# Patient Record
Sex: FEMALE | Race: WHITE | NOT HISPANIC OR LATINO | Employment: OTHER | URBAN - METROPOLITAN AREA
[De-identification: names, ages, dates, MRNs, and addresses within clinical notes are randomized per-mention and may not be internally consistent; named-entity substitution may affect disease eponyms.]

---

## 2022-08-23 ENCOUNTER — TELEPHONE (OUTPATIENT)
Dept: OBGYN CLINIC | Facility: CLINIC | Age: 67
End: 2022-08-23

## 2022-08-23 DIAGNOSIS — Z12.31 ENCOUNTER FOR SCREENING MAMMOGRAM FOR MALIGNANT NEOPLASM OF BREAST: Primary | ICD-10-CM

## 2022-08-23 NOTE — TELEPHONE ENCOUNTER
Mammogram order generated, note sent to José Luis Mora in [place] to print for patient  and call patient when ready

## 2022-09-20 DIAGNOSIS — Z12.31 ENCOUNTER FOR SCREENING MAMMOGRAM FOR MALIGNANT NEOPLASM OF BREAST: ICD-10-CM

## 2022-11-25 NOTE — PROGRESS NOTES
Assessment/Plan:    Encounter for gynecological examination (general) (routine) without abnormal findings  Here for Medicare biannual check  Questions OAB, constipation with intermittent RUQ pain, difficulty maintaining hygiene with hemorrhoidal tags, and memory concerns  Normal breast and pelvic exams  Last pap 9/2019 negative/HPV negative; no further indicated  Mammo order given, last 9/14/22 reviewed on [facility]  Colonoscopy 2020,due 2025  Dexa 7/3/2018 WNL with femoral neck T-1 1    Encouraged fiber daily for constipation and stool issues  Discussed incontinence  Recommended bladder training with timed voids, avoidance of irritants specifically tea/coffee/soda products and Kegel exercises  Due to history of glaucoma would avoid meds until cleared by opthalmology  Encouraged f/u with PCP for memory concerns  Diagnoses and all orders for this visit:    Encounter for gynecological examination (general) (routine) without abnormal findings    Encounter for screening mammogram for malignant neoplasm of breast  -     Mammo screening bilateral w 3d & cad; Future    OAB (overactive bladder)    Other orders  -     Liquid-based pap, screening  -     levothyroxine (Synthroid) 137 mcg tablet  -     atorvastatin (LIPITOR) 20 mg tablet  -     Multiple Vitamins-Minerals (MULTIVITAMIN ADULTS 50+ PO)          Subjective:      Patient ID: Sofya Hugo is a 79 y o  female  HPI  Here for Medicare biannual breast and pelvic exams  The following portions of the patient's history were reviewed and updated as appropriate:   She  has a past medical history of Glaucoma, Hyperlipidemia, Hypothyroidism due to Hashimoto's thyroiditis, Osteoarthritis, and Tinnitus    She   Patient Active Problem List    Diagnosis Date Noted   • Encounter for gynecological examination (general) (routine) without abnormal findings 11/28/2022   • OAB (overactive bladder) 11/28/2022     She  has a past surgical history that includes Colonoscopy (2015); DXA procedure(historical) (2018); Thyroid surgery (1991); Knee surgery (1991); Shoulder surgery (Right, 2014); and Replacement total knee (Left, 08/2020)  Her family history includes Colon polyps in her brother; Prostate cancer in her brother; Prostate cancer (age of onset: 61) in her father  She  reports that she has never smoked  She has never used smokeless tobacco  She reports current alcohol use  She reports that she does not use drugs  Current Outpatient Medications   Medication Sig Dispense Refill   • atorvastatin (LIPITOR) 20 mg tablet      • levothyroxine (Synthroid) 137 mcg tablet      • Multiple Vitamins-Minerals (MULTIVITAMIN ADULTS 50+ PO)        No current facility-administered medications for this visit  She has No Known Allergies       Review of Systems  No PMB, breast, bladder, bowel changes   +intermittent RUQ pain +constipation  +urge incontinence in AM      Objective:      /68 (BP Location: Left arm, Patient Position: Sitting, Cuff Size: Standard)   Ht 5' (1 524 m)   Wt 68 9 kg (151 lb 12 8 oz)   Breastfeeding No   BMI 29 65 kg/m²          Physical Exam    General appearance: no distress, pleasant  Neck: thyroid without nodules or thyromegaly, no palpable adenopathy  Lymph nodes: no palpable adenopathy  Breasts: no masses, nodes or skin changes  Abdomen: soft, non tender, no palpable masses  Pelvic exam: normal atrophic external genitalia, urethral meatus normal, vagina atrophic without lesions, cervix atrophic without lesions, uterus small, non tender, no adnexal masses, non tender  Rectal exam: normal sphincter tone, no masses, RV confirms above

## 2022-11-28 ENCOUNTER — ANNUAL EXAM (OUTPATIENT)
Dept: OBGYN CLINIC | Facility: CLINIC | Age: 67
End: 2022-11-28

## 2022-11-28 VITALS
HEIGHT: 60 IN | DIASTOLIC BLOOD PRESSURE: 68 MMHG | WEIGHT: 151.8 LBS | BODY MASS INDEX: 29.8 KG/M2 | SYSTOLIC BLOOD PRESSURE: 104 MMHG

## 2022-11-28 DIAGNOSIS — Z12.31 ENCOUNTER FOR SCREENING MAMMOGRAM FOR MALIGNANT NEOPLASM OF BREAST: ICD-10-CM

## 2022-11-28 DIAGNOSIS — K59.09 OTHER CONSTIPATION: ICD-10-CM

## 2022-11-28 DIAGNOSIS — Z01.419 ENCOUNTER FOR GYNECOLOGICAL EXAMINATION (GENERAL) (ROUTINE) WITHOUT ABNORMAL FINDINGS: Primary | ICD-10-CM

## 2022-11-28 DIAGNOSIS — N32.81 OAB (OVERACTIVE BLADDER): ICD-10-CM

## 2022-11-28 RX ORDER — LEVOTHYROXINE SODIUM 137 UG/1
TABLET ORAL
COMMUNITY
Start: 1992-03-05

## 2022-11-28 RX ORDER — ATORVASTATIN CALCIUM 20 MG/1
TABLET, FILM COATED ORAL
COMMUNITY
Start: 2019-07-20

## 2022-11-28 NOTE — PATIENT INSTRUCTIONS
Return to office in two years unless having any problems such as breast changes, bleeding, new persistent pain, new progressive bloating, new problems eating (getting full to quickly) or new constant urinary pressure that does not resolve in one week  Call in 18 months; May of 2024 for you November 2024 appointment    Follow up with Dr Agata Flores for your memory concerns and possible testing  For the incontinence, I recommend bladder training with timed voids every 2-3 hours while awake, avoidance of irritants specifically tea/coffee/soda products and Kegel exercises  You can teach yourself the exercises by stopping your urine flow on the toilet  Then hold for 10 seconds, 10 times in a row  If you do that set of exercises 2-3 times per day, you will gain strength, then when you sneeze you can squeeze up first and getter better control  Ask you opthalmologist whether you can take over active bladder medication with your particular type of glaucoma  Citrucel, metamucil or Benefiber daily for your bowel regularity

## 2022-11-28 NOTE — ASSESSMENT & PLAN NOTE
Here for Medicare biannual check  Questions OAB, constipation with intermittent RUQ pain, difficulty maintaining hygiene with hemorrhoidal tags, and memory concerns  Normal breast and pelvic exams  Last pap 9/2019 negative/HPV negative; no further indicated  Mammo order given, last 9/14/22 reviewed on [facility]  Colonoscopy 2020,due 2025  Dexa 7/3/2018 WNL with femoral neck T-1 1    Encouraged fiber daily for constipation and stool issues  Discussed incontinence  Recommended bladder training with timed voids, avoidance of irritants specifically tea/coffee/soda products and Kegel exercises  Due to history of glaucoma would avoid meds until cleared by opthalmology  Encouraged f/u with PCP for memory concerns

## 2024-01-16 ENCOUNTER — OFFICE VISIT (OUTPATIENT)
Dept: OBGYN CLINIC | Facility: CLINIC | Age: 69
End: 2024-01-16
Payer: MEDICARE

## 2024-01-16 VITALS
HEIGHT: 60 IN | WEIGHT: 150.6 LBS | DIASTOLIC BLOOD PRESSURE: 72 MMHG | SYSTOLIC BLOOD PRESSURE: 118 MMHG | BODY MASS INDEX: 29.57 KG/M2

## 2024-01-16 DIAGNOSIS — L72.3 SEBACEOUS CYST: ICD-10-CM

## 2024-01-16 DIAGNOSIS — R10.2 PELVIC PAIN: ICD-10-CM

## 2024-01-16 DIAGNOSIS — F41.8 ANXIETY ABOUT HEALTH: ICD-10-CM

## 2024-01-16 DIAGNOSIS — R31.0 GROSS HEMATURIA: Primary | ICD-10-CM

## 2024-01-16 PROBLEM — R45.89 ANXIETY ABOUT HEALTH: Status: ACTIVE | Noted: 2024-01-16

## 2024-01-16 PROCEDURE — 99215 OFFICE O/P EST HI 40 MIN: CPT | Performed by: OBSTETRICS & GYNECOLOGY

## 2024-01-16 RX ORDER — AMOXICILLIN 500 MG
CAPSULE ORAL 2 TIMES DAILY
COMMUNITY

## 2024-01-16 NOTE — PATIENT INSTRUCTIONS
I would recommend seeing your gastroenterologist for the discomfort, family history of polyps and the diverticulosis seen on the CT.     Please call the office if you do not hear about your results in 10-14 days.     Have Dr Cabral look at your sebaceous cyst on the left chest.    Make an appointment with a urologist for the history of blood in your urine. You need further evaluation to assess the inside of your bladder.

## 2024-01-16 NOTE — PROGRESS NOTES
"Assessment/Plan:    Gross hematuria  67 yo  presents with c/o recent UTI with gross hematuria and feeling of something \"not right\" in abdomen.   Also with constipation, recently better with metaucil.   Was in [place] last week, no problems then.     Reviewed 23 CT from [facility] with diverticula, increased colon gas and stool, distended bladder with significant diffuse mural thickening suggestive of cystitis. No adnexal masses. Significant disc and facet degeneration several levls, severe levoscoliosis  Reviewed PCP note 23. Pt was in urgent care 23 for UTI, hematuria noted 23. Was referred to urology but did not schedule as recently moved out of area.     Normal pelvic exam noted.   Discussed symptoms may be due to constipation, will continue metamucil and f/u with her GI for possible early colonoscopy which is due .   Ordered pelvic u/s to rule out pelvic pathology not seen on CT, will contact with results.    Addressed health anxiety with 2 good friends with cancer and concern to find something if it is there. Reviewed up to date with breast, colon, cervical and skin cancer screening without evidence of disease currently.   Stressed urology follow up to evaluated bladder with probable cystoscopy to rule out bladder pathology.     RTO for biannual exam 2024, prn problems.      Sebaceous cyst  Very small left sebaceous cyst noted below left breast. Will f/u with derm next week at scheduled appt.   Normal breast exam, last mammo reviewed on [facility] 10/5/23 BIRADS 1B.    Anxiety about health  Reassured that health anxiety is common especially when friends have dealt with cancer. Reassurance given today, encouraged continued screening for future issues and stressed f/u with GI and  providers.  Agrees to plan.       Diagnoses and all orders for this visit:    Gross hematuria  -     Cytology, urine    Pelvic pain  -     US pelvis complete w transvaginal; Future    Sebaceous cyst    Anxiety " about health    Other orders  -     Omega-3 Fatty Acids (Fish Oil) 1200 MG CAPS; Take by mouth 2 (two) times a day  -     Calcium Citrate-Vitamin D (CALCIUM + D PO); Take by mouth          Subjective:      Patient ID: Mary Franklin is a 68 y.o. female.    HPI here with several concerns.    The following portions of the patient's history were reviewed and updated as appropriate: She  has a past medical history of Glaucoma, History of screening mammography (09/14/2022), Hyperlipidemia, Hypothyroidism due to Hashimoto's thyroiditis, Miscarriage, Osteoarthritis, Tinnitus, and UTI (urinary tract infection) (09/2023).  She   Patient Active Problem List    Diagnosis Date Noted    Pelvic pain 01/16/2024    Gross hematuria 01/16/2024    Sebaceous cyst 01/16/2024    Anxiety about health 01/16/2024    Encounter for gynecological examination (general) (routine) without abnormal findings 11/28/2022    OAB (overactive bladder) 11/28/2022    Other constipation 11/28/2022     She  has a past surgical history that includes Colonoscopy (2015); DXA procedure(historical) (2018); Thyroid surgery (1991); Knee surgery (1991); Shoulder surgery (Right, 2014); and Replacement total knee (Left, 08/2020).  Her family history includes Cancer in her brother and father; Colon polyps in her brother; Osteoarthritis in her mother; Prostate cancer in her brother; Prostate cancer (age of onset: 63) in her father.  She  reports that she has never smoked. She has never used smokeless tobacco. She reports current alcohol use of about 1.0 standard drink of alcohol per week. She reports that she does not use drugs.  Current Outpatient Medications   Medication Sig Dispense Refill    atorvastatin (LIPITOR) 20 mg tablet       Calcium Citrate-Vitamin D (CALCIUM + D PO) Take by mouth      levothyroxine (Synthroid) 137 mcg tablet       Multiple Vitamins-Minerals (MULTIVITAMIN ADULTS 50+ PO)       Omega-3 Fatty Acids (Fish Oil) 1200 MG CAPS Take by mouth 2 (two)  "times a day       No current facility-administered medications for this visit.     She is allergic to dust mite extract..    Review of Systems   Constitutional:  Negative for appetite change, chills, fever and unexpected weight change.   Gastrointestinal:  Positive for constipation. Negative for abdominal distention, abdominal pain, blood in stool, diarrhea, nausea and vomiting.        \"Something not right\", increased gas   Genitourinary:  Positive for dyspareunia and hematuria. Negative for dysuria, frequency, pelvic pain, urgency, vaginal bleeding and vaginal discharge.        Gross hematuria resolved after antibiotic treatment   Musculoskeletal:  Positive for back pain.        Height loss, scoliosis   Neurological:         C/o memory issues   Psychiatric/Behavioral:          Anxiety         Objective:      /72   Ht 5' (1.524 m)   Wt 68.3 kg (150 lb 9.6 oz)   Breastfeeding No   BMI 29.41 kg/m²          Physical Exam    General appearance: no distress, pleasant, anxious  Neck: thyroid without nodules or thyromegaly, no palpable adenopathy  Lymph nodes: no palpable adenopathy  Breasts: no masses, nodes or skin changes  Chest: left 2 mm sebaceous cyst below breast  Abdomen: soft, non tender, no palpable masses  Pelvic exam: normal atrophic external genitalia, urethral meatus normal, vagina atrophic without lesions, cervix atrophic without lesions, uterus small, non tender, no adnexal masses, non tender  Rectal exam: normal sphincter tone, no masses, RV confirms above      I have spent a total time of 50 minutes on 01/16/24 in caring for this patient including Diagnostic results, Prognosis, Risks and benefits of tx options, Instructions for management, Patient and family education, Importance of tx compliance, Risk factor reductions, Impressions, Counseling / Coordination of care, Documenting in the medical record, Reviewing / ordering tests, medicine, procedures  , Obtaining or reviewing history  , and " Communicating with other healthcare professionals .

## 2024-01-16 NOTE — ASSESSMENT & PLAN NOTE
Very small left sebaceous cyst noted below left breast. Will f/u with derm next week at scheduled appt.   Normal breast exam, last mammo reviewed on [facility] 10/5/23 BIRADS 1B.

## 2024-01-16 NOTE — ASSESSMENT & PLAN NOTE
"69 yo  presents with c/o recent UTI with gross hematuria and feeling of something \"not right\" in abdomen.   Also with constipation, recently better with metaucil.   Was in [place] last week, no problems then.     Reviewed 23 CT from [facility] with diverticula, increased colon gas and stool, distended bladder with significant diffuse mural thickening suggestive of cystitis. No adnexal masses. Significant disc and facet degeneration several levls, severe levoscoliosis  Reviewed PCP note 23. Pt was in urgent care 23 for UTI, hematuria noted 23. Was referred to urology but did not schedule as recently moved out of area.     Normal pelvic exam noted.   Discussed symptoms may be due to constipation, will continue metamucil and f/u with her GI for possible early colonoscopy which is due .   Ordered pelvic u/s to rule out pelvic pathology not seen on CT, will contact with results.    Addressed health anxiety with 2 good friends with cancer and concern to find something if it is there. Reviewed up to date with breast, colon, cervical and skin cancer screening without evidence of disease currently.   Stressed urology follow up to evaluated bladder with probable cystoscopy to rule out bladder pathology.     RTO for biannual exam 2024, prn problems.    "

## 2024-01-16 NOTE — LETTER
"2024     Shawna Ron MD  [address] [facility] [address]    Patient: Mary Franklin   YOB: 1955   Date of Visit: 2024       Dear Dr. Ron:     Mary Franklin was in to see me today for evaluation. Below are my notes for this consultation.    If you have questions, please do not hesitate to call me. I look forward to following your patient along with you.         Sincerely,        Lor Yoder MD        CC: No Recipients    Lor Yoder MD  2024 12:53 PM  Sign when Signing Visit  Assessment/Plan:    Gross hematuria  69 yo  presents with c/o recent UTI with gross hematuria and feeling of something \"not right\" in abdomen.   Also with constipation, recently better with metaucil.   Was in [place] last week, no problems then.     Reviewed 23 CT from [facility] with diverticula, increased colon gas and stool, distended bladder with significant diffuse mural thickening suggestive of cystitis. No adnexal masses. Significant disc and facet degeneration several levls, severe levoscoliosis  Reviewed PCP note 23. Pt was in urgent care 23 for UTI, hematuria noted 23. Was referred to urology but did not schedule as recently moved out of area.     Normal pelvic exam noted.   Discussed symptoms may be due to constipation, will continue metamucil and f/u with her GI for possible early colonoscopy which is due .   Ordered pelvic u/s to rule out pelvic pathology not seen on CT, will contact with results.    Addressed health anxiety with 2 good friends with cancer and concern to find something if it is there. Reviewed up to date with breast, colon, cervical and skin cancer screening without evidence of disease currently.   Stressed urology follow up to evaluated bladder with probable cystoscopy to rule out bladder pathology.     RTO for biannual exam 2024, prn problems.      Sebaceous cyst  Very small left sebaceous cyst noted below left breast. Will f/u with derm next " week at scheduled appt.   Normal breast exam, last mammo reviewed on [facility] 10/5/23 BIRADS 1B.    Anxiety about health  Reassured that health anxiety is common especially when friends have dealt with cancer. Reassurance given today, encouraged continued screening for future issues and stressed f/u with GI and  providers.  Agrees to plan.       Diagnoses and all orders for this visit:    Gross hematuria  -     Cytology, urine    Pelvic pain  -     US pelvis complete w transvaginal; Future    Sebaceous cyst    Anxiety about health    Other orders  -     Omega-3 Fatty Acids (Fish Oil) 1200 MG CAPS; Take by mouth 2 (two) times a day  -     Calcium Citrate-Vitamin D (CALCIUM + D PO); Take by mouth          Subjective:      Patient ID: Mary Franklin is a 68 y.o. female.    HPI here with several concerns.    The following portions of the patient's history were reviewed and updated as appropriate: She  has a past medical history of Glaucoma, History of screening mammography (09/14/2022), Hyperlipidemia, Hypothyroidism due to Hashimoto's thyroiditis, Miscarriage, Osteoarthritis, Tinnitus, and UTI (urinary tract infection) (09/2023).  She   Patient Active Problem List    Diagnosis Date Noted   • Pelvic pain 01/16/2024   • Gross hematuria 01/16/2024   • Sebaceous cyst 01/16/2024   • Anxiety about health 01/16/2024   • Encounter for gynecological examination (general) (routine) without abnormal findings 11/28/2022   • OAB (overactive bladder) 11/28/2022   • Other constipation 11/28/2022     She  has a past surgical history that includes Colonoscopy (2015); DXA procedure(historical) (2018); Thyroid surgery (1991); Knee surgery (1991); Shoulder surgery (Right, 2014); and Replacement total knee (Left, 08/2020).  Her family history includes Cancer in her brother and father; Colon polyps in her brother; Osteoarthritis in her mother; Prostate cancer in her brother; Prostate cancer (age of onset: 63) in her father.  She  reports  "that she has never smoked. She has never used smokeless tobacco. She reports current alcohol use of about 1.0 standard drink of alcohol per week. She reports that she does not use drugs.  Current Outpatient Medications   Medication Sig Dispense Refill   • atorvastatin (LIPITOR) 20 mg tablet      • Calcium Citrate-Vitamin D (CALCIUM + D PO) Take by mouth     • levothyroxine (Synthroid) 137 mcg tablet      • Multiple Vitamins-Minerals (MULTIVITAMIN ADULTS 50+ PO)      • Omega-3 Fatty Acids (Fish Oil) 1200 MG CAPS Take by mouth 2 (two) times a day       No current facility-administered medications for this visit.     She is allergic to dust mite extract..    Review of Systems   Constitutional:  Negative for appetite change, chills, fever and unexpected weight change.   Gastrointestinal:  Positive for constipation. Negative for abdominal distention, abdominal pain, blood in stool, diarrhea, nausea and vomiting.        \"Something not right\", increased gas   Genitourinary:  Positive for dyspareunia and hematuria. Negative for dysuria, frequency, pelvic pain, urgency, vaginal bleeding and vaginal discharge.        Gross hematuria resolved after antibiotic treatment   Musculoskeletal:  Positive for back pain.        Height loss, scoliosis   Neurological:         C/o memory issues   Psychiatric/Behavioral:          Anxiety         Objective:      /72   Ht 5' (1.524 m)   Wt 68.3 kg (150 lb 9.6 oz)   Breastfeeding No   BMI 29.41 kg/m²          Physical Exam    General appearance: no distress, pleasant, anxious  Neck: thyroid without nodules or thyromegaly, no palpable adenopathy  Lymph nodes: no palpable adenopathy  Breasts: no masses, nodes or skin changes  Chest: left 2 mm sebaceous cyst below breast  Abdomen: soft, non tender, no palpable masses  Pelvic exam: normal atrophic external genitalia, urethral meatus normal, vagina atrophic without lesions, cervix atrophic without lesions, uterus small, non tender, no " adnexal masses, non tender  Rectal exam: normal sphincter tone, no masses, RV confirms above      I have spent a total time of 50 minutes on 01/16/24 in caring for this patient including Diagnostic results, Prognosis, Risks and benefits of tx options, Instructions for management, Patient and family education, Importance of tx compliance, Risk factor reductions, Impressions, Counseling / Coordination of care, Documenting in the medical record, Reviewing / ordering tests, medicine, procedures  , Obtaining or reviewing history  , and Communicating with other healthcare professionals .

## 2024-01-16 NOTE — ASSESSMENT & PLAN NOTE
Reassured that health anxiety is common especially when friends have dealt with cancer. Reassurance given today, encouraged continued screening for future issues and stressed f/u with GI and  providers.  Agrees to plan.

## 2024-02-21 PROBLEM — Z01.419 ENCOUNTER FOR GYNECOLOGICAL EXAMINATION (GENERAL) (ROUTINE) WITHOUT ABNORMAL FINDINGS: Status: RESOLVED | Noted: 2022-11-28 | Resolved: 2024-02-21

## 2024-02-28 ENCOUNTER — TELEPHONE (OUTPATIENT)
Dept: OBGYN CLINIC | Facility: CLINIC | Age: 69
End: 2024-02-28

## 2024-02-28 NOTE — TELEPHONE ENCOUNTER
Please call pt with normal pelvic ultrasound and encourage follow up with urology if not already done.   Thanks.   ambulatory

## 2024-02-28 NOTE — TELEPHONE ENCOUNTER
Placed call to patient, reviewed lab results and follow up instructions. Patient verbalized understanding and had no additional questions.     Patient did follow up with urology.  Patient is getting a colonoscopy tomorrow. All appointments are through [facility].

## 2024-03-05 ENCOUNTER — TRANSCRIBE ORDERS (OUTPATIENT)
Dept: SCHEDULING | Age: 69
End: 2024-03-05

## 2024-03-05 DIAGNOSIS — Z96.652 PRESENCE OF LEFT ARTIFICIAL KNEE JOINT: Primary | ICD-10-CM

## 2024-03-26 ENCOUNTER — TELEPHONE (OUTPATIENT)
Age: 69
End: 2024-03-26

## 2024-03-26 DIAGNOSIS — N95.2 VAGINAL ATROPHY: Primary | ICD-10-CM

## 2024-03-26 RX ORDER — ESTRADIOL 10 UG/1
1 INSERT VAGINAL 2 TIMES WEEKLY
Qty: 8 TABLET | Refills: 11 | Status: SHIPPED | OUTPATIENT
Start: 2024-03-28

## 2024-03-26 NOTE — TELEPHONE ENCOUNTER
Called pt back. No answer. Left a vm per medical communication consent in chart informing that Dr. Yoder sent prescription to pharmacy and encouraged to look at [facility] [employer] [facility] for coupons. Encouraged pt to call back if she has additional questions.

## 2024-03-26 NOTE — TELEPHONE ENCOUNTER
Yuvafem rx from 2020 seen in [facility]. Rx sent to pharmacy, encourage pt to utilize [facility] for coupon if possible.

## 2024-03-26 NOTE — TELEPHONE ENCOUNTER
Pt calling requesting Dr. Yoder re-orders vaginal insert medication that helped with vaginal dryness as she has been experiencing onset of pain during intercourse. RN unable to see medication listed in medication list so unable to queue and pend. RN informed will send message to provider with request. Pt advised that she now moved, so new pharmacy would be [facility] [place], on [address]. Pharmacy updated in chart. Pt agreeable to plan. No further questions.

## 2024-04-17 ENCOUNTER — TELEPHONE (OUTPATIENT)
Dept: NEUROSURGERY | Facility: CLINIC | Age: 69
End: 2024-04-17
Payer: MEDICARE

## 2024-04-17 NOTE — TELEPHONE ENCOUNTER
New Patient:   Dr. Ortega    Referring Provider:   Dr. Siobhan Stauffer (PCP)    MRI: Lumbar Spine 24 @ [facility], patient will get disc    X-rays: Lumbar spine 24 @ [facility], patient will get disc     Dexa: 10/5/23 [facility], report scanned    EM24 Dr. Jhon Poe at [facility], scanned    Onset of sympotms/reason for visit:   Symptom started 2 yrs ago w/o event and they come and go. Low back pain that is right sided radiating to left buttock and down left leg to foot with tingling. Tingling in both hands and left arm. Heat helps her pain. Pain 5-10/10.    Physical Therapy: No    Pain Management: No    Previous Surgery: No    Endocrinologist: No, had thyroid removed years ago    Rheumatologist: No     Insurance: Medicare

## 2024-04-18 DIAGNOSIS — M81.0 OSTEOPOROSIS, SENILE: ICD-10-CM

## 2024-04-18 DIAGNOSIS — M48.062 LUMBAR STENOSIS WITH NEUROGENIC CLAUDICATION: Primary | ICD-10-CM

## 2024-04-18 NOTE — TELEPHONE ENCOUNTER
Patient scheduled 5/17 @ 11am. MRI [facility]. Will bring disc. Xrays [place]. Will bring disc. New xray [facility] or [facility]. Will bring disc if needed. NP ppw mailed with script. Dexa done 2023 scanned in.

## 2024-04-29 ENCOUNTER — HOSPITAL ENCOUNTER (OUTPATIENT)
Dept: RADIOLOGY | Age: 69
Discharge: HOME | End: 2024-04-29
Attending: NEUROLOGICAL SURGERY
Payer: MEDICARE

## 2024-04-29 DIAGNOSIS — M48.062 LUMBAR STENOSIS WITH NEUROGENIC CLAUDICATION: ICD-10-CM

## 2024-04-29 PROCEDURE — 72114 X-RAY EXAM L-S SPINE BENDING: CPT

## 2024-05-07 ENCOUNTER — OFFICE VISIT (OUTPATIENT)
Dept: NEUROSURGERY | Facility: CLINIC | Age: 69
End: 2024-05-07
Payer: MEDICARE

## 2024-05-07 VITALS
SYSTOLIC BLOOD PRESSURE: 120 MMHG | WEIGHT: 154 LBS | RESPIRATION RATE: 16 BRPM | TEMPERATURE: 96.8 F | HEIGHT: 60 IN | BODY MASS INDEX: 30.23 KG/M2 | HEART RATE: 70 BPM | OXYGEN SATURATION: 98 % | DIASTOLIC BLOOD PRESSURE: 60 MMHG

## 2024-05-07 DIAGNOSIS — M41.50 SCOLIOSIS DUE TO DEGENERATIVE DISEASE OF SPINE IN ADULT PATIENT: ICD-10-CM

## 2024-05-07 DIAGNOSIS — M48.062 LUMBAR STENOSIS WITH NEUROGENIC CLAUDICATION: ICD-10-CM

## 2024-05-07 DIAGNOSIS — M47.22 CERVICAL SPONDYLOSIS WITH RADICULOPATHY: Primary | ICD-10-CM

## 2024-05-07 PROCEDURE — 99204 OFFICE O/P NEW MOD 45 MIN: CPT | Performed by: NEUROLOGICAL SURGERY

## 2024-05-07 RX ORDER — LEVOTHYROXINE SODIUM 112 UG/1
TABLET ORAL
COMMUNITY
Start: 2023-09-25

## 2024-05-07 RX ORDER — ATORVASTATIN CALCIUM 20 MG/1
TABLET, FILM COATED ORAL
COMMUNITY
Start: 2023-07-13

## 2024-05-07 NOTE — PROGRESS NOTES
Dear Dr. Stauffer,    I had the pleasure of meeting a patient of yours in the office today.  Thank you for your kind referral.  As you may recall, Tracey Robertson is a pleasant 68 year old female who presents for evaluation of low back and left leg tingling. Patient states her symptoms began in October 2023 following a UTI. She began feeling sharp pain in her lower back, eccentric to the right. The pain is intermittent, and only felt with certain movements. She is able to relieve the pain with heat and rest. In December of 2023, she began having tingling down her left leg, into her left foot. The tingling is constant. She also has pain in her left buttocks. Patient had an abdominal CT scan when she had her UTI, which showed scoliosis, prompting her evaluation today.     Patient has been participating in physical therapy for her left knee, which was replaced recently, but the therapist has not been working on her lower back. She has not seen pain management, and does not take any medications for her symptoms. She ambulates without assistive device.      I have reviewed her EMG which does show left L5 and S1 radiculopathies.    Imaging: I personally reviewed her lumbar MRI and order x-rays with dynamic views.  She has well-preserved lumbar lordosis but does have a severe levoscoliosis of the lumbar spine that is likely adult degeneration of an adolescent scoliosis.  She has severe collapse of the left side of the L4-5 and L5-S1 disc spaces resulting in severe lateral recess and foraminal stenosis with compression of the S1 nerve roots.  She also has a herniated disc on the left side at L4-5 that further compresses the traversing L5 nerve root in the lateral recess.  She has significant collapse of multiple other disc spaces but no severe stenosis at the other level.  I reviewed her 2023 DEXA scan which shows osteopenia with the lowest T-score -1.2.                            Review of Systems: 14 point review of systems  are negative except for the following pertinent positives:concussion, hearing loss, neck pain, memory loss.    Medical History:   Past Medical History:   Diagnosis Date   • ADD (attention deficit disorder)    • Arthritis    • Disease of thyroid gland    • Glaucoma    • Lipid disorder        Surgical History:   Past Surgical History:   Procedure Laterality Date   • CATARACT EXTRACTION     • EYE SURGERY     • MENISCECTOMY     • REPLACEMENT TOTAL KNEE     • SHOULDER SURGERY     • THYROID SURGERY     • TRIGGER FINGER RELEASE     • WRIST SURGERY      ganglion cyst       Social History:   Social History     Socioeconomic History   • Marital status:      Spouse name: None   • Number of children: None   • Years of education: None   • Highest education level: None   Tobacco Use   • Smoking status: Never   • Smokeless tobacco: Never   Substance and Sexual Activity   • Alcohol use: Yes     Comment: occasional       Family History:   Family History   Problem Relation Age of Onset   • Prostate cancer Biological Father        Allergies: Patient has no known allergies.    Home Medications:  Not in a hospital admission.    Current Medications:  •  atorvastatin  •  CALCIUM ORAL  •  docosahexaenoic acid/epa (FISH OIL ORAL)  •  levothyroxine  •  MULTIVITAMIN ORAL      Physicial Exam    Vital Signs   Vitals:    05/07/24 1349   BP: 120/60   Pulse: 70   Resp: 16   Temp: (!) 36 °C (96.8 °F)   SpO2: 98%       Awake, alert, oriented to person, place, time and situation; speech and fund of knowledge normal. Head NC/AT.  PERRL, extra-ocular movements intact, face symmetric, tongue protrudes to midline, no nystagmus or diplopia.  Neck is supple, has full range of motion and there is no evidence of JVD.  There is no tenderness to palpation.  Breathing comfortably without distress, tachypnea, wheezing or use of accessory muscles.  Heart has a regular rate and rhythm. Abdomen ND.   Skin is warm, well perfused, with good capillary refill.   Limbs show no deformities or edema.    Neurological examination:    PERRL, extra-ocular movements intact, face symmetric, tongue protrudes to midline, no nystagmus or diplopia.  Motor:     RUE:  D  5/5, B  5/5, T 5/5, WE 5/5, HG 5/5, IH 5/5   LUE:  D  5/5, B  5/5, T 5/5, WE 5/5, HG 5/5, IH 5/5   RLE:  IP  5/5, Q  5/5, DF 5/5, EHL 5/5, PF 5/5   LLE:  IP  5/5, Q  5/5, DF 5/5, EHL 5/5, PF 5/5  Reflexes:  Normoreflexive, no Healy's or Babinski signs, no clonus  Sensory exam:  Intact to light touch, pain, temperature and JPS testing  Gait and posture normal.  No dysmetria.      Assessment/Plan     This is a 68-year-old woman with a significant scoliosis which certainly could explain some of her right-sided lower back pain and I believe her tingling and paresthesias stenosis she has from collapse of the left L5-S1 disc spaces.  Given the symptom of tingling, I would not recommend surgical intervention as this would likely involve decompression with interbody and posterolateral fusion.  Indications to proceed with surgical intervention including the significant progression of her scoliotic deformity, severe nerve compression resulting in significant neurological loss, particularly motor loss or severe nerve compression resulting in intractable radicular pain.  We certainly could repeat scoliosis x-rays in 6 to 12 months to follow her curvature but she reports she has had lots of images over the years for various injuries and this not wish these x-rays.  I educated her on the signs and symptoms of worsening neurological function and she can follow-up on an as-needed basis.  All questions were answered.  Thank you very much for the opportunity to be involved in the care of your patient.  Please call the office should any questions or problems arise.    Sincerely,       Rafael Ortega MD

## 2024-05-07 NOTE — LETTER
May 7, 2024     Siobhan Stauffer MD  [address] [place] [address] at [facility] [address]    Patient: Tracey Robertson  YOB: 1955  Date of Visit: 5/7/2024      Dear Dr. Stauffer:    Thank you for referring Tracey Robertson to me for evaluation. Below are my notes for this consultation.    If you have questions, please do not hesitate to call me. I look forward to following your patient along with you.         Sincerely,        Rafael Ortega MD        CC: No Recipients    Rafael Ortega MD  5/7/2024  4:33 PM  Signed  Dear Dr. Stauffer,    I had the pleasure of meeting a patient of yours in the office today.  Thank you for your kind referral.  As you may recall, Tracey Robertson is a pleasant 68 year old female who presents for evaluation of low back and left leg tingling. Patient states her symptoms began in October 2023 following a UTI. She began feeling sharp pain in her lower back, eccentric to the right. The pain is intermittent, and only felt with certain movements. She is able to relieve the pain with heat and rest. In December of 2023, she began having tingling down her left leg, into her left foot. The tingling is constant. She also has pain in her left buttocks. Patient had an abdominal CT scan when she had her UTI, which showed scoliosis, prompting her evaluation today.     Patient has been participating in physical therapy for her left knee, which was replaced recently, but the therapist has not been working on her lower back. She has not seen pain management, and does not take any medications for her symptoms. She ambulates without assistive device.      I have reviewed her EMG which does show left L5 and S1 radiculopathies.    Imaging: I personally reviewed her lumbar MRI and order x-rays with dynamic views.  She has well-preserved lumbar lordosis but does have a severe levoscoliosis of the lumbar spine that is likely adult degeneration of an adolescent scoliosis.  She has  severe collapse of the left side of the L4-5 and L5-S1 disc spaces resulting in severe lateral recess and foraminal stenosis with compression of the S1 nerve roots.  She also has a herniated disc on the left side at L4-5 that further compresses the traversing L5 nerve root in the lateral recess.  She has significant collapse of multiple other disc spaces but no severe stenosis at the other level.  I reviewed her 2023 DEXA scan which shows osteopenia with the lowest T-score -1.2.                            Review of Systems: 14 point review of systems are negative except for the following pertinent positives:concussion, hearing loss, neck pain, memory loss.    Medical History:   Past Medical History:   Diagnosis Date   • ADD (attention deficit disorder)    • Arthritis    • Disease of thyroid gland    • Glaucoma    • Lipid disorder        Surgical History:   Past Surgical History:   Procedure Laterality Date   • CATARACT EXTRACTION     • EYE SURGERY     • MENISCECTOMY     • REPLACEMENT TOTAL KNEE     • SHOULDER SURGERY     • THYROID SURGERY     • TRIGGER FINGER RELEASE     • WRIST SURGERY      ganglion cyst       Social History:   Social History     Socioeconomic History   • Marital status:      Spouse name: None   • Number of children: None   • Years of education: None   • Highest education level: None   Tobacco Use   • Smoking status: Never   • Smokeless tobacco: Never   Substance and Sexual Activity   • Alcohol use: Yes     Comment: occasional       Family History:   Family History   Problem Relation Age of Onset   • Prostate cancer Biological Father        Allergies: Patient has no known allergies.    Home Medications:  Not in a hospital admission.    Current Medications:  •  atorvastatin  •  CALCIUM ORAL  •  docosahexaenoic acid/epa (FISH OIL ORAL)  •  levothyroxine  •  MULTIVITAMIN ORAL      Physicial Exam    Vital Signs   Vitals:    05/07/24 1349   BP: 120/60   Pulse: 70   Resp: 16   Temp: (!) 36 °C  (96.8 °F)   SpO2: 98%       Awake, alert, oriented to person, place, time and situation; speech and fund of knowledge normal. Head NC/AT.  PERRL, extra-ocular movements intact, face symmetric, tongue protrudes to midline, no nystagmus or diplopia.  Neck is supple, has full range of motion and there is no evidence of JVD.  There is no tenderness to palpation.  Breathing comfortably without distress, tachypnea, wheezing or use of accessory muscles.  Heart has a regular rate and rhythm. Abdomen ND.   Skin is warm, well perfused, with good capillary refill.  Limbs show no deformities or edema.    Neurological examination:    PERRL, extra-ocular movements intact, face symmetric, tongue protrudes to midline, no nystagmus or diplopia.  Motor:     RUE:  D  5/5, B  5/5, T 5/5, WE 5/5, HG 5/5, IH 5/5   LUE:  D  5/5, B  5/5, T 5/5, WE 5/5, HG 5/5, IH 5/5   RLE:  IP  5/5, Q  5/5, DF 5/5, EHL 5/5, PF 5/5   LLE:  IP  5/5, Q  5/5, DF 5/5, EHL 5/5, PF 5/5  Reflexes:  Normoreflexive, no Healy's or Babinski signs, no clonus  Sensory exam:  Intact to light touch, pain, temperature and JPS testing  Gait and posture normal.  No dysmetria.      Assessment/Plan     This is a 68-year-old woman with a significant scoliosis which certainly could explain some of her right-sided lower back pain and I believe her tingling and paresthesias stenosis she has from collapse of the left L5-S1 disc spaces.  Given the symptom of tingling, I would not recommend surgical intervention as this would likely involve decompression with interbody and posterolateral fusion.  Indications to proceed with surgical intervention including the significant progression of her scoliotic deformity, severe nerve compression resulting in significant neurological loss, particularly motor loss or severe nerve compression resulting in intractable radicular pain.  We certainly could repeat scoliosis x-rays in 6 to 12 months to follow her curvature but she reports she has had  lots of images over the years for various injuries and this not wish these x-rays.  I educated her on the signs and symptoms of worsening neurological function and she can follow-up on an as-needed basis.  All questions were answered.  Thank you very much for the opportunity to be involved in the care of your patient.  Please call the office should any questions or problems arise.    Sincerely,       Rafael Ortega MD

## 2024-05-14 ENCOUNTER — TELEPHONE (OUTPATIENT)
Dept: NEUROSURGERY | Facility: CLINIC | Age: 69
End: 2024-05-14
Payer: MEDICARE

## 2024-05-14 NOTE — TELEPHONE ENCOUNTER
Pt called because she went for her first session of PT yesterday at [facility] and the physical therapist wouldn't do PT because of the constant tingling she has in her arms into her hands/fingers. She is going away on Friday and asking for any advice.    [phone]

## 2024-05-21 DIAGNOSIS — M47.22 CERVICAL SPONDYLOSIS WITH RADICULOPATHY: Primary | ICD-10-CM

## 2024-06-03 ENCOUNTER — HOSPITAL ENCOUNTER (OUTPATIENT)
Dept: RADIOLOGY | Facility: HOSPITAL | Age: 69
Discharge: HOME | End: 2024-06-03
Attending: PHYSICIAN ASSISTANT
Payer: MEDICARE

## 2024-06-03 VITALS — WEIGHT: 153 LBS | BODY MASS INDEX: 29.88 KG/M2

## 2024-06-03 DIAGNOSIS — M47.22 CERVICAL SPONDYLOSIS WITH RADICULOPATHY: ICD-10-CM

## 2024-06-14 ENCOUNTER — OFFICE VISIT (OUTPATIENT)
Dept: NEUROSURGERY | Facility: CLINIC | Age: 69
End: 2024-06-14
Payer: MEDICARE

## 2024-06-14 VITALS
WEIGHT: 153 LBS | SYSTOLIC BLOOD PRESSURE: 124 MMHG | RESPIRATION RATE: 16 BRPM | BODY MASS INDEX: 27.11 KG/M2 | OXYGEN SATURATION: 97 % | TEMPERATURE: 96.4 F | HEART RATE: 80 BPM | HEIGHT: 63 IN | DIASTOLIC BLOOD PRESSURE: 80 MMHG

## 2024-06-14 DIAGNOSIS — M48.062 LUMBAR STENOSIS WITH NEUROGENIC CLAUDICATION: Primary | ICD-10-CM

## 2024-06-14 DIAGNOSIS — M47.22 CERVICAL SPONDYLOSIS WITH RADICULOPATHY: ICD-10-CM

## 2024-06-14 DIAGNOSIS — M41.50 SCOLIOSIS DUE TO DEGENERATIVE DISEASE OF SPINE IN ADULT PATIENT: ICD-10-CM

## 2024-06-14 PROCEDURE — 99213 OFFICE O/P EST LOW 20 MIN: CPT | Performed by: NEUROLOGICAL SURGERY

## 2024-06-14 NOTE — PROGRESS NOTES
Dear Dr. Stauffer,    Ms. Robertson returned to the office for follow-up.  I recently saw her for her scoliosis and lumbar symptoms.  We are managing her conservatively at this time and I sent her for physical therapy.  She had mentioned to the physical therapist that she has numbness in her hands and feet and therapy was halted.  She was sent for a cervical MRI which does show she has some degenerative disc disease at multiple levels but she has stenosis essentially only at the C5-6 level.  She has a right-sided disc osteophyte complex with loss of disc space height that does flatten the spinal cord on that side and cause foraminal stenosis compressing the exiting C6 nerve root.  On examination she has 5 out of 5 strength in all major muscle groups and no pathological reflexes.  Her major symptoms are paresthesias in her arms but most severe in her hands.  She has had a right carpal tunnel release surgery but not the left.  It is difficult for her to tell if the tingling is only on the palmar or dorsal sides of the hands but again has no objective findings of neurological loss from radiculopathy or myelopathy.  She is cleared to resume physical therapy.  I would like to see her back in the office in 6 months with new cervical and long cassette scoliosis x-rays.      Indications to proceed with any surgical procedure in the cervical spine such as C5-6 ACDF would include development of spinal cord compression with findings of myelopathy on examination, neurological/motor loss due to severe compression of nerve roots or intractable radicular pain, which she is not having at this time.  All questions were answered.  I spent 20 minutes on this date of service performing the following activities: Preparing for the visit, obtaining/reviewing records, independently reviewing studies, obtaining history/performing examination, counseling and education, ordering tests, medications and studies, communicating results, coordinating  care and documentation.    Thank you very much for the opportunity to be involved in the care of your patient.  Please call the office should any questions or problems arise.    Sincerely,       Rafael Ortega MD

## 2024-06-14 NOTE — LETTER
June 14, 2024     Siobhan Stauffer MD  [address] [place] [address] at [facility] [address]    Patient: Tracey Robertson  YOB: 1955  Date of Visit: 6/14/2024      Dear Dr. Stauffer:    Thank you for referring Tracey Robertson to me for evaluation. Below are my notes for this consultation.    If you have questions, please do not hesitate to call me. I look forward to following your patient along with you.         Sincerely,        Rafael Ortega MD        CC: No Recipients    Rafael Ortega MD  6/14/2024  3:32 PM  Signed  Dear Dr. Stauffer,    Ms. Robertson returned to the office for follow-up.  I recently saw her for her scoliosis and lumbar symptoms.  We are managing her conservatively at this time and I sent her for physical therapy.  She had mentioned to the physical therapist that she has numbness in her hands and feet and therapy was halted.  She was sent for a cervical MRI which does show she has some degenerative disc disease at multiple levels but she has stenosis essentially only at the C5-6 level.  She has a right-sided disc osteophyte complex with loss of disc space height that does flatten the spinal cord on that side and cause foraminal stenosis compressing the exiting C6 nerve root.  On examination she has 5 out of 5 strength in all major muscle groups and no pathological reflexes.  Her major symptoms are paresthesias in her arms but most severe in her hands.  She has had a right carpal tunnel release surgery but not the left.  It is difficult for her to tell if the tingling is only on the palmar or dorsal sides of the hands but again has no objective findings of neurological loss from radiculopathy or myelopathy.  She is cleared to resume physical therapy.  I would like to see her back in the office in 6 months with new cervical and long cassette scoliosis x-rays.      Indications to proceed with any surgical procedure in the cervical spine such as C5-6 ACDF would include  development of spinal cord compression with findings of myelopathy on examination, neurological/motor loss due to severe compression of nerve roots or intractable radicular pain, which she is not having at this time.  All questions were answered.  I spent 20 minutes on this date of service performing the following activities: Preparing for the visit, obtaining/reviewing records, independently reviewing studies, obtaining history/performing examination, counseling and education, ordering tests, medications and studies, communicating results, coordinating care and documentation.    Thank you very much for the opportunity to be involved in the care of your patient.  Please call the office should any questions or problems arise.    Sincerely,       Rafael Ortega MD

## 2024-09-30 ENCOUNTER — TELEPHONE (OUTPATIENT)
Age: 69
End: 2024-09-30

## 2024-09-30 DIAGNOSIS — Z12.31 ENCOUNTER FOR SCREENING MAMMOGRAM FOR MALIGNANT NEOPLASM OF BREAST: Primary | ICD-10-CM

## 2024-09-30 NOTE — TELEPHONE ENCOUNTER
Patient moved but still wants to come to our practice. She did just schedule her annual and is now requesting a mammo script to be mailed to her new address so that she can get it done in the area where she lives.

## 2024-10-14 DIAGNOSIS — M41.50 SCOLIOSIS DUE TO DEGENERATIVE DISEASE OF SPINE IN ADULT PATIENT: ICD-10-CM

## 2024-10-14 DIAGNOSIS — M48.062 LUMBAR STENOSIS WITH NEUROGENIC CLAUDICATION: Primary | ICD-10-CM

## 2024-10-22 ENCOUNTER — TRANSCRIBE ORDERS (OUTPATIENT)
Dept: NEUROSURGERY | Facility: CLINIC | Age: 69
End: 2024-10-22
Payer: MEDICARE

## 2024-10-25 ENCOUNTER — TELEPHONE (OUTPATIENT)
Age: 69
End: 2024-10-25

## 2024-10-25 NOTE — TELEPHONE ENCOUNTER
Patient called, attempting to access their appt details via [facility] but pt unable to login. Attempted to assist with resetting passwrod & pt even answered identifier questions but still not allowing access. Provided [facility] support ph# [phone], acct may be locked.

## 2024-10-30 ENCOUNTER — TRANSCRIBE ORDERS (OUTPATIENT)
Dept: NEUROSURGERY | Facility: CLINIC | Age: 69
End: 2024-10-30
Payer: MEDICARE

## 2024-10-30 DIAGNOSIS — M47.22 CERVICAL SPONDYLOSIS WITH RADICULOPATHY: Primary | ICD-10-CM

## 2024-11-06 ENCOUNTER — HOSPITAL ENCOUNTER (OUTPATIENT)
Dept: MAMMOGRAPHY | Facility: CLINIC | Age: 69
Discharge: HOME | End: 2024-11-06
Payer: MEDICARE

## 2024-11-06 VITALS — WEIGHT: 150 LBS | BODY MASS INDEX: 29.45 KG/M2 | HEIGHT: 60 IN

## 2024-11-06 DIAGNOSIS — Z12.31 ENCOUNTER FOR SCREENING MAMMOGRAM FOR MALIGNANT NEOPLASM OF BREAST: ICD-10-CM

## 2024-11-06 PROCEDURE — 77063 BREAST TOMOSYNTHESIS BI: CPT

## 2024-11-06 PROCEDURE — 77067 SCR MAMMO BI INCL CAD: CPT

## 2024-11-08 ENCOUNTER — TELEPHONE (OUTPATIENT)
Age: 69
End: 2024-11-08

## 2024-11-14 ENCOUNTER — TELEPHONE (OUTPATIENT)
Age: 69
End: 2024-11-14

## 2024-11-14 ENCOUNTER — RESULTS FOLLOW-UP (OUTPATIENT)
Dept: OBGYN CLINIC | Facility: CLINIC | Age: 69
End: 2024-11-14

## 2024-11-14 NOTE — TELEPHONE ENCOUNTER
Patient states she is frustrated , currently locked out of her MyChart. We reviewed mammo result and providers notes. Patient asked about new breast US order. Reviewed chart imaging orders, last office visit notes 1/2023 and discussed ultrasound order is for Pelvis. Patient states she thought she was to get breast US but will d/w provider if this is needed after normal mammo at next annual visit. Patient will f/u with pelvis US.

## 2024-11-15 ENCOUNTER — TELEPHONE (OUTPATIENT)
Age: 69
End: 2024-11-15

## 2024-11-15 NOTE — TELEPHONE ENCOUNTER
Patient called in regards to what type of mammogram recently completed. NFA needed.  
Normal for race

## 2024-11-30 ENCOUNTER — HOSPITAL ENCOUNTER (OUTPATIENT)
Dept: RADIOLOGY | Age: 69
Discharge: HOME | End: 2024-11-30
Attending: NEUROLOGICAL SURGERY
Payer: MEDICARE

## 2024-11-30 DIAGNOSIS — M41.50 SCOLIOSIS DUE TO DEGENERATIVE DISEASE OF SPINE IN ADULT PATIENT: ICD-10-CM

## 2024-11-30 DIAGNOSIS — M47.22 CERVICAL SPONDYLOSIS WITH RADICULOPATHY: ICD-10-CM

## 2024-11-30 PROCEDURE — 72052 X-RAY EXAM NECK SPINE 6/>VWS: CPT

## 2024-11-30 PROCEDURE — 72082 X-RAY EXAM ENTIRE SPI 2/3 VW: CPT

## 2025-01-09 ENCOUNTER — OFFICE VISIT (OUTPATIENT)
Dept: NEUROSURGERY | Facility: CLINIC | Age: 70
End: 2025-01-09
Payer: MEDICARE

## 2025-01-09 VITALS
TEMPERATURE: 97.2 F | OXYGEN SATURATION: 99 % | HEIGHT: 63 IN | BODY MASS INDEX: 27.11 KG/M2 | WEIGHT: 153 LBS | HEART RATE: 61 BPM | SYSTOLIC BLOOD PRESSURE: 138 MMHG | DIASTOLIC BLOOD PRESSURE: 70 MMHG | RESPIRATION RATE: 18 BRPM

## 2025-01-09 DIAGNOSIS — M41.50 SCOLIOSIS DUE TO DEGENERATIVE DISEASE OF SPINE IN ADULT PATIENT: Primary | ICD-10-CM

## 2025-01-09 PROCEDURE — 99212 OFFICE O/P EST SF 10 MIN: CPT | Performed by: NEUROLOGICAL SURGERY

## 2025-01-09 NOTE — LETTER
January 9, 2025     Siobhan Stauffer MD  [address] [place] [address] at [facility] [id number]    Patient: Tracey Robertson  YOB: 1955  Date of Visit: 1/9/2025      Dear Dr. Stauffer:    Thank you for referring Tracey Robertson to me for evaluation. Below are my notes for this consultation.    If you have questions, please do not hesitate to call me. I look forward to following your patient along with you.         Sincerely,        Rafael Ortega MD        CC: No Recipients    Rafael Ortega MD  1/9/2025  4:02 PM  Sign when Signing Visit  Dear Dr. Stauffer,    Ms. Robertson return to the office for follow-up.  I have followed her over the years for cervical spondylosis and a lumbar levoscoliosis.  She returns with stable imaging.  Her curve is now progressed.  She has stable collapse and osteophyte formation of the C5-6 disc space without malalignment or instability.  She has minimal neck pain at this time and no radicular pain into the arms or legs.  She has no neurological deficits.  Her major pain is in the right flank area and lower back.  It is worse at night when she is changing positions.  Otherwise she is not having classic radicular pain into her legs or weakness.  We will continue to manage her conservatively.  We can repeat her scoliosis x-rays in 12 to 18 months.  All questions were answered.  I spent 15 minutes on this date of service performing the following activities: Preparing for the visit, obtaining/reviewing records, independently reviewing studies, obtaining history/performing examination, counseling and education, ordering tests, medications and studies, communicating results, coordinating care and documentation.    Thank you very much for the opportunity to be involved in the care of your patient.  Please call the office should any questions or problems arise.    Sincerely,       Rafael Ortega MD

## 2025-01-09 NOTE — PROGRESS NOTES
Dear Dr. Stauffer,    Ms. Robertson return to the office for follow-up.  I have followed her over the years for cervical spondylosis and a lumbar levoscoliosis.  She returns with stable imaging.  Her curve is now progressed.  She has stable collapse and osteophyte formation of the C5-6 disc space without malalignment or instability.  She has minimal neck pain at this time and no radicular pain into the arms or legs.  She has no neurological deficits.  Her major pain is in the right flank area and lower back.  It is worse at night when she is changing positions.  Otherwise she is not having classic radicular pain into her legs or weakness.  We will continue to manage her conservatively.  We can repeat her scoliosis x-rays in 12 to 18 months.  All questions were answered.  I spent 15 minutes on this date of service performing the following activities: Preparing for the visit, obtaining/reviewing records, independently reviewing studies, obtaining history/performing examination, counseling and education, ordering tests, medications and studies, communicating results, coordinating care and documentation.    Thank you very much for the opportunity to be involved in the care of your patient.  Please call the office should any questions or problems arise.    Sincerely,       Rafael Ortega MD

## 2025-01-13 ENCOUNTER — TRANSCRIBE ORDERS (OUTPATIENT)
Dept: NEUROSURGERY | Facility: CLINIC | Age: 70
End: 2025-01-13
Payer: MEDICARE

## 2025-01-13 DIAGNOSIS — M47.22 CERVICAL SPONDYLOSIS WITH RADICULOPATHY: Primary | ICD-10-CM

## 2025-01-16 ENCOUNTER — TRANSCRIBE ORDERS (OUTPATIENT)
Dept: SCHEDULING | Age: 70
End: 2025-01-16

## 2025-01-16 DIAGNOSIS — R10.2 PELVIC AND PERINEAL PAIN: Primary | ICD-10-CM

## 2025-01-17 NOTE — PROGRESS NOTES
Name: Mary Franklin      : 1955      MRN: [id number]  Encounter Provider: Lor Yoder MD  Encounter Date: 2025   Encounter department: [facility] [place]  :  Assessment & Plan  Encounter for gynecological examination (general) (routine) without abnormal findings  Here for well check, continues with intermittent right lower quadrant discomfort since 2024.  No breast or gyn complaints.   Normal breast and pelvic exams.  Last pap  neg/HPV neg; repeated today  Mammo order given, last 24 BIRADS 1A  Colonoscopy , due ; sees GI regularly  Dexa with PCP , will follow with PCP       Pelvic pain  Since 2023 after episode of hematuria. Had normal u/s reviewed from  and  with normal uterus and ovaries.   Has chronic constipation, sees GI, CT ordered by them to schedule. Recent normal colonoscopy.   Normal pelvic exam.   Significant scoliosis with back pain. Pain seems superficial on right lower abdominal wall, not appreciated on vaginal exam. Pain management discussed for possible etiology of referred back pain.       Screening for malignant neoplasm of the cervix    Orders:    Thinprep Tis Pap Reflex HPV mRNA E6/E7    Breast cancer screening by mammogram    Orders:    Mammo screening bilateral w 3d and cad; Future        History of Present Illness   HPI  Mary Franklin is a 69 y.o. female who presents for biannual breast and pelvic exams and continues with pelvic pain.    Review of Systems   No PMB, breast, bladder, bowel changes. No new persistent pain, bloating, early satiety or pelvic pressure  Unchanged intermittent RLQ discomfort    Past Medical History   Past Medical History:   Diagnosis Date    Glaucoma     Hyperlipidemia     Hypothyroidism due to Hashimoto's thyroiditis     Miscarriage     Osteoarthritis     Tinnitus     UTI (urinary tract infection) 09/2023    x 2     Past Surgical History:   Procedure Laterality Date    COLONOSCOPY  2015     DXA PROCEDURE (HISTORICAL)  2018    per pt report    KNEE SURGERY  1991    REPLACEMENT TOTAL KNEE Left 08/2020    SHOULDER SURGERY Right 2014    THYROID SURGERY  1991     Family History   Problem Relation Age of Onset    Osteoarthritis Mother     Prostate cancer Father 63    Cancer Father         prostate    No Known Problems Sister     No Known Problems Daughter     No Known Problems Daughter     No Known Problems Maternal Grandmother     No Known Problems Maternal Grandfather     No Known Problems Paternal Grandmother     No Known Problems Paternal Grandfather     Prostate cancer Brother     Colon polyps Brother     Cancer Brother         Prostate    No Known Problems Maternal Aunt     No Known Problems Maternal Aunt     No Known Problems Maternal Aunt     No Known Problems Maternal Aunt     No Known Problems Paternal Aunt     No Known Problems Paternal Aunt     Uterine cancer Neg Hx     Breast cancer Neg Hx     Ovarian cancer Neg Hx     Colon cancer Neg Hx       reports that she has never smoked. She has never used smokeless tobacco. She reports current alcohol use of about 1.0 standard drink of alcohol per week. She reports that she does not use drugs.  Current Outpatient Medications on File Prior to Visit   Medication Sig Dispense Refill    atorvastatin (LIPITOR) 20 mg tablet       Calcium Citrate-Vitamin D (CALCIUM + D PO) Take by mouth      estradiol (VAGIFEM, YUVAFEM) 10 MCG TABS vaginal tablet Insert 1 tablet (10 mcg total) into the vagina 2 (two) times a week 8 tablet 11    levothyroxine 112 mcg tablet       Multiple Vitamins-Minerals (MULTIVITAMIN ADULTS 50+ PO)       polyethylene glycol (MIRALAX) 17 g packet Take 17 g by mouth daily      Omega-3 Fatty Acids (Fish Oil) 1200 MG CAPS Take by mouth 2 (two) times a day (Patient not taking: Reported on 1/21/2025)       No current facility-administered medications on file prior to visit.     Allergies   Allergen Reactions    Dust Mite Extract Nasal  Congestion         Objective   /70 (BP Location: Left arm, Patient Position: Sitting, Cuff Size: Standard)   Ht 5' (1.524 m)   Wt 69.3 kg (152 lb 12.8 oz)   BMI 29.84 kg/m²      Physical Exam  General appearance: no distress, pleasant  Neck: thyroid without nodules or thyromegaly, no palpable adenopathy  Lymph nodes: no palpable adenopathy  Breasts: no masses, nodes or skin changes  Abdomen: soft, non tender, no palpable masses. Very superficial discomfort on right lower quad palpation.  Pelvic exam: normal atrophic external genitalia, urethral meatus normal, vagina atrophic without lesions, cervix atrophic without lesions, uterus small, non tender, no adnexal masses, non tender  Rectal exam: normal sphincter tone, no masses, RV confirms above

## 2025-01-21 ENCOUNTER — ANNUAL EXAM (OUTPATIENT)
Dept: OBGYN CLINIC | Facility: CLINIC | Age: 70
End: 2025-01-21
Payer: MEDICARE

## 2025-01-21 VITALS
SYSTOLIC BLOOD PRESSURE: 122 MMHG | HEIGHT: 60 IN | WEIGHT: 152.8 LBS | BODY MASS INDEX: 30 KG/M2 | DIASTOLIC BLOOD PRESSURE: 70 MMHG

## 2025-01-21 DIAGNOSIS — R10.2 PELVIC PAIN: ICD-10-CM

## 2025-01-21 DIAGNOSIS — Z12.4 SCREENING FOR MALIGNANT NEOPLASM OF THE CERVIX: ICD-10-CM

## 2025-01-21 DIAGNOSIS — Z12.31 BREAST CANCER SCREENING BY MAMMOGRAM: ICD-10-CM

## 2025-01-21 DIAGNOSIS — Z01.419 ENCOUNTER FOR GYNECOLOGICAL EXAMINATION (GENERAL) (ROUTINE) WITHOUT ABNORMAL FINDINGS: Primary | ICD-10-CM

## 2025-01-21 PROCEDURE — G0101 CA SCREEN;PELVIC/BREAST EXAM: HCPCS | Performed by: OBSTETRICS & GYNECOLOGY

## 2025-01-21 PROCEDURE — 99213 OFFICE O/P EST LOW 20 MIN: CPT | Performed by: OBSTETRICS & GYNECOLOGY

## 2025-01-21 RX ORDER — POLYETHYLENE GLYCOL 3350 17 G/17G
17 POWDER, FOR SOLUTION ORAL DAILY
COMMUNITY

## 2025-01-21 NOTE — ASSESSMENT & PLAN NOTE
Since 11/2023 after episode of hematuria. Had normal u/s reviewed from 2023 and 2024 with normal uterus and ovaries.   Has chronic constipation, sees GI, CT ordered by them to schedule. Recent normal colonoscopy.   Normal pelvic exam.   Significant scoliosis with back pain. Pain seems superficial on right lower abdominal wall, not appreciated on vaginal exam. Pain management discussed for possible etiology of referred back pain.

## 2025-01-21 NOTE — ASSESSMENT & PLAN NOTE
Here for well check, continues with intermittent right lower quadrant discomfort since 1/2024.  No breast or gyn complaints.   Normal breast and pelvic exams.  Last pap 2019 neg/HPV neg; repeated today  Mammo order given, last 11/6/24 BIRADS 1A  Colonoscopy 2024, due 2029; sees GI regularly  Dexa with PCP 2023, will follow with PCP

## 2025-01-21 NOTE — PATIENT INSTRUCTIONS
To consider pain management for possible referred pain from your back.     Return to office in two years unless having any problems such as breast changes, bleeding, new persistent pain, new progressive bloating, new problems eating (getting full to quickly) or new constant urinary pressure that does not resolve in one week.

## 2025-01-21 NOTE — LETTER
2025     Shawna Ron MD  [address] [place] [id number]    Patient: Mary Franklin   YOB: 1955   Date of Visit: 2025       Dear Dr. Ron:    Thank you for referring Mary Franklin to me for evaluation. Below are my notes for this consultation.    If you have questions, please do not hesitate to call me. I look forward to following your patient along with you.         Sincerely,        Lor Yoder MD        CC: No Recipients    Lor Yoder MD  2025  1:04 PM  Sign when Signing Visit  Name: Mary Franklin      : 1955      MRN: [id number]  Encounter Provider: Lor Yoder MD  Encounter Date: 2025   Encounter department: [facility] [place]  :  Assessment & Plan  Encounter for gynecological examination (general) (routine) without abnormal findings  Here for well check, continues with intermittent right lower quadrant discomfort since 2024.  No breast or gyn complaints.   Normal breast and pelvic exams.  Last pap  neg/HPV neg; repeated today  Mammo order given, last 24 BIRADS 1A  Colonoscopy , due ; sees GI regularly  Dexa with PCP , will follow with PCP       Pelvic pain  Since 2023 after episode of hematuria. Had normal u/s reviewed from  and  with normal uterus and ovaries.   Has chronic constipation, sees GI, CT ordered by them to schedule. Recent normal colonoscopy.   Normal pelvic exam.   Significant scoliosis with back pain. Pain seems superficial on right lower abdominal wall, not appreciated on vaginal exam. Pain management discussed for possible etiology of referred back pain.       Screening for malignant neoplasm of the cervix    Orders:  •  Thinprep Tis Pap Reflex HPV mRNA E6/E7    Breast cancer screening by mammogram    Orders:  •  Mammo screening bilateral w 3d and cad; Future        History of Present Illness  HPI  Mary Franklin is a 69 y.o. female who presents for biannual breast and pelvic  exams and continues with pelvic pain.    Review of Systems   No PMB, breast, bladder, bowel changes. No new persistent pain, bloating, early satiety or pelvic pressure  Unchanged intermittent RLQ discomfort    Past Medical History  Past Medical History:   Diagnosis Date   • Glaucoma    • Hyperlipidemia    • Hypothyroidism due to Hashimoto's thyroiditis    • Miscarriage    • Osteoarthritis    • Tinnitus    • UTI (urinary tract infection) 09/2023    x 2     Past Surgical History:   Procedure Laterality Date   • COLONOSCOPY  2015    Due 2020   • DXA PROCEDURE (HISTORICAL)  2018    per pt report   • KNEE SURGERY  1991   • REPLACEMENT TOTAL KNEE Left 08/2020   • SHOULDER SURGERY Right 2014   • THYROID SURGERY  1991     Family History   Problem Relation Age of Onset   • Osteoarthritis Mother    • Prostate cancer Father 63   • Cancer Father         prostate   • No Known Problems Sister    • No Known Problems Daughter    • No Known Problems Daughter    • No Known Problems Maternal Grandmother    • No Known Problems Maternal Grandfather    • No Known Problems Paternal Grandmother    • No Known Problems Paternal Grandfather    • Prostate cancer Brother    • Colon polyps Brother    • Cancer Brother         Prostate   • No Known Problems Maternal Aunt    • No Known Problems Maternal Aunt    • No Known Problems Maternal Aunt    • No Known Problems Maternal Aunt    • No Known Problems Paternal Aunt    • No Known Problems Paternal Aunt    • Uterine cancer Neg Hx    • Breast cancer Neg Hx    • Ovarian cancer Neg Hx    • Colon cancer Neg Hx       reports that she has never smoked. She has never used smokeless tobacco. She reports current alcohol use of about 1.0 standard drink of alcohol per week. She reports that she does not use drugs.  Current Outpatient Medications on File Prior to Visit   Medication Sig Dispense Refill   • atorvastatin (LIPITOR) 20 mg tablet      • Calcium Citrate-Vitamin D (CALCIUM + D PO) Take by mouth     •  estradiol (VAGIFEM, YUVAFEM) 10 MCG TABS vaginal tablet Insert 1 tablet (10 mcg total) into the vagina 2 (two) times a week 8 tablet 11   • levothyroxine 112 mcg tablet      • Multiple Vitamins-Minerals (MULTIVITAMIN ADULTS 50+ PO)      • polyethylene glycol (MIRALAX) 17 g packet Take 17 g by mouth daily     • Omega-3 Fatty Acids (Fish Oil) 1200 MG CAPS Take by mouth 2 (two) times a day (Patient not taking: Reported on 1/21/2025)       No current facility-administered medications on file prior to visit.     Allergies   Allergen Reactions   • Dust Mite Extract Nasal Congestion         Objective  /70 (BP Location: Left arm, Patient Position: Sitting, Cuff Size: Standard)   Ht 5' (1.524 m)   Wt 69.3 kg (152 lb 12.8 oz)   BMI 29.84 kg/m²      Physical Exam  General appearance: no distress, pleasant  Neck: thyroid without nodules or thyromegaly, no palpable adenopathy  Lymph nodes: no palpable adenopathy  Breasts: no masses, nodes or skin changes  Abdomen: soft, non tender, no palpable masses. Very superficial discomfort on right lower quad palpation.  Pelvic exam: normal atrophic external genitalia, urethral meatus normal, vagina atrophic without lesions, cervix atrophic without lesions, uterus small, non tender, no adnexal masses, non tender  Rectal exam: normal sphincter tone, no masses, RV confirms above

## 2025-01-24 LAB
CLINICAL INFO: NORMAL
CYTO CVX: NORMAL
CYTOLOGY CMNT CVX/VAG CYTO-IMP: NORMAL
DATE PREVIOUS BX: NORMAL
LMP START DATE: NORMAL
SL AMB PREV. PAP:: NORMAL
SPECIMEN SOURCE CVX/VAG CYTO: NORMAL

## 2025-01-25 ENCOUNTER — RESULTS FOLLOW-UP (OUTPATIENT)
Dept: OBGYN CLINIC | Facility: CLINIC | Age: 70
End: 2025-01-25

## 2025-01-28 ENCOUNTER — TRANSCRIBE ORDERS (OUTPATIENT)
Dept: SCHEDULING | Age: 70
End: 2025-01-28

## 2025-01-28 DIAGNOSIS — R93.5 ABNORMAL FINDINGS ON DIAGNOSTIC IMAGING OF OTHER ABDOMINAL REGIONS, INCLUDING RETROPERITONEUM: Primary | ICD-10-CM

## 2025-02-05 ENCOUNTER — HOSPITAL ENCOUNTER (OUTPATIENT)
Dept: RADIOLOGY | Age: 70
Discharge: HOME | End: 2025-02-05
Attending: INTERNAL MEDICINE
Payer: MEDICARE

## 2025-02-05 DIAGNOSIS — R93.5 ABNORMAL FINDINGS ON DIAGNOSTIC IMAGING OF OTHER ABDOMINAL REGIONS, INCLUDING RETROPERITONEUM: ICD-10-CM

## 2025-02-05 RX ORDER — GADOBUTROL 604.72 MG/ML
6.9 INJECTION INTRAVENOUS ONCE
Status: COMPLETED | OUTPATIENT
Start: 2025-02-05 | End: 2025-02-05

## 2025-02-05 RX ADMIN — GADOBUTROL 6.9 ML: 604.72 INJECTION INTRAVENOUS at 15:02

## 2025-02-05 NOTE — PROGRESS NOTES
"Tracey Aguilar Ailyn   [phone]    Your doctor has referred you for a MRI ENTEROGRAPHY WITH WITHOUT CONTRAST that requires the injection of a contrast material into your bloodstream. This contrast material, sometimes referred to as \"x-ray dye\" allows for better interpretation and results in a more accurate interpretation of the examination.     Without the use of contrast, the examination may be less informative and result in a suboptimal examination, and possibly a delay in diagnosis and, if needed, treatment.     The contrast material is given through a small needle or catheter placed into a vein, usually on the inside of the elbow, on the back of hand, or in a vein in the foot or lower leg.    The most common, though still rare, potential reaction to an intravenous contrast injection is an allergic-like reaction. Most allergic-like reactions are mild, though a small subset of people can have more severe reactions. Mild reactions include mild / scattered hives, itching, scratchy throat, sneezing and nasal congestion. More severe reactions include facial swelling, severe difficulty breathing, wheezing and anaphylactic shock. Those with a prior history allergic-like reaction to the same class of contrast media (such as CT contrast or MRI contrast) are at the highest risk for an allergic reaction.     If you believe you had an allergic reaction to contrast in the past, please let our staff know. We can determine if this increases your risk for a future reaction and provide steps to decrease the risk. This may delay your examination, but it decreases the risk of having a new and possibly more severe reaction to the contrast injection.    People with a history of prior allergic reactions to other substances (such as unrelated medications and food) and patients with a history of asthma have slightly increased risk for an allergic reaction from contrast material when compared with the general population, but do not require " to be pretreated prior to a contrast injection.    You should notify the physician, nurse or technologist if you have ever had any of these conditions or if you have any questions.

## 2025-04-25 ENCOUNTER — TELEPHONE (OUTPATIENT)
Dept: NEUROSURGERY | Facility: CLINIC | Age: 70
End: 2025-04-25
Payer: MEDICARE

## 2025-04-25 RX ORDER — CYCLOBENZAPRINE HCL 5 MG
5 TABLET ORAL 3 TIMES DAILY PRN
Qty: 40 TABLET | Refills: 1 | Status: SHIPPED | OUTPATIENT
Start: 2025-04-25

## 2025-04-25 RX ORDER — PREDNISONE 20 MG/1
TABLET ORAL
Qty: 15 TABLET | Refills: 0 | Status: SHIPPED | OUTPATIENT
Start: 2025-04-25

## 2025-04-25 NOTE — TELEPHONE ENCOUNTER
Spoke with patient - she said she's having pain on the R side of her neck into her R shoulder and arm. She lifted a 10lb bag of bird food yesterday so she thinks that's what triggered it. She said it feels like muscle pain she can move her arm, just can't lift it. She has an appt with her PCP office 1pm today.

## 2025-04-25 NOTE — TELEPHONE ENCOUNTER
----- Message from Kamila BARROSO sent at 4/25/2025 10:34 AM EDT -----  Patient left message stating that they are having severe pain in their neck that radiates down their shoulder. They can barely lift their arm. Phone # [phone]

## 2025-04-25 NOTE — TELEPHONE ENCOUNTER
Informed patient, she said the PA at her PCP office said she had frozen shoulder syndrome and gave her home exercises to do but she will  medication at pharmacy.